# Patient Record
Sex: MALE | Race: WHITE
[De-identification: names, ages, dates, MRNs, and addresses within clinical notes are randomized per-mention and may not be internally consistent; named-entity substitution may affect disease eponyms.]

---

## 2020-07-02 ENCOUNTER — HOSPITAL ENCOUNTER (EMERGENCY)
Dept: HOSPITAL 12 - ER | Age: 75
Discharge: HOME | End: 2020-07-02
Payer: COMMERCIAL

## 2020-07-02 VITALS — SYSTOLIC BLOOD PRESSURE: 160 MMHG | DIASTOLIC BLOOD PRESSURE: 100 MMHG

## 2020-07-02 VITALS — WEIGHT: 260 LBS | BODY MASS INDEX: 35.21 KG/M2 | HEIGHT: 72 IN

## 2020-07-02 DIAGNOSIS — I49.1: ICD-10-CM

## 2020-07-02 DIAGNOSIS — E11.9: ICD-10-CM

## 2020-07-02 DIAGNOSIS — I51.7: ICD-10-CM

## 2020-07-02 DIAGNOSIS — R09.89: Primary | ICD-10-CM

## 2020-07-02 PROCEDURE — A4663 DIALYSIS BLOOD PRESSURE CUFF: HCPCS

## 2020-07-02 NOTE — NUR
Patient cleared for DC to home.  Written and verbal after care instructions 
given. 

Patient verbalizes understanding of instructions. Stressed follow up to 
specialist provided by MD. Ambulated out of ER in steady gait and stable 
condition.

## 2020-07-12 ENCOUNTER — HOSPITAL ENCOUNTER (EMERGENCY)
Dept: HOSPITAL 12 - ER | Age: 75
Discharge: ANOTHER HOSPITAL | End: 2020-07-12
Payer: COMMERCIAL

## 2020-07-12 VITALS — BODY MASS INDEX: 35.21 KG/M2 | WEIGHT: 260 LBS | HEIGHT: 72 IN

## 2020-07-12 VITALS — SYSTOLIC BLOOD PRESSURE: 142 MMHG | DIASTOLIC BLOOD PRESSURE: 84 MMHG

## 2020-07-12 DIAGNOSIS — Z90.49: ICD-10-CM

## 2020-07-12 DIAGNOSIS — Z79.84: ICD-10-CM

## 2020-07-12 DIAGNOSIS — I51.7: ICD-10-CM

## 2020-07-12 DIAGNOSIS — Z96.643: ICD-10-CM

## 2020-07-12 DIAGNOSIS — I49.1: ICD-10-CM

## 2020-07-12 DIAGNOSIS — N13.30: ICD-10-CM

## 2020-07-12 DIAGNOSIS — D72.829: ICD-10-CM

## 2020-07-12 DIAGNOSIS — E11.9: ICD-10-CM

## 2020-07-12 DIAGNOSIS — R09.89: ICD-10-CM

## 2020-07-12 DIAGNOSIS — N17.9: Primary | ICD-10-CM

## 2020-07-12 LAB
ALP SERPL-CCNC: 60 U/L (ref 50–136)
ALT SERPL W/O P-5'-P-CCNC: 20 U/L (ref 16–63)
APPEARANCE UR: CLEAR
AST SERPL-CCNC: 14 U/L (ref 15–37)
BASOPHILS # BLD AUTO: 0.1 K/UL (ref 0–8)
BASOPHILS NFR BLD AUTO: 0.7 % (ref 0–2)
BILIRUB DIRECT SERPL-MCNC: 0.1 MG/DL (ref 0–0.2)
BILIRUB SERPL-MCNC: 0.3 MG/DL (ref 0.2–1)
BILIRUB UR QL STRIP: NEGATIVE
BUN SERPL-MCNC: 123 MG/DL (ref 7–18)
CHLORIDE SERPL-SCNC: 98 MMOL/L (ref 98–107)
CO2 SERPL-SCNC: 21 MMOL/L (ref 21–32)
COLOR UR: YELLOW
CREAT SERPL-MCNC: 7.6 MG/DL (ref 0.6–1.3)
DEPRECATED SQUAMOUS URNS QL MICRO: (no result) /HPF
EOSINOPHIL # BLD AUTO: 0.1 K/UL (ref 0–0.7)
EOSINOPHIL NFR BLD AUTO: 0.6 % (ref 0–7)
GLUCOSE SERPL-MCNC: 100 MG/DL (ref 74–106)
GLUCOSE UR STRIP-MCNC: NEGATIVE MG/DL
HCT VFR BLD AUTO: 39.3 % (ref 36.7–47.1)
HGB BLD-MCNC: 13.1 G/DL (ref 12.5–16.3)
HGB UR QL STRIP: (no result)
KETONES UR STRIP-MCNC: NEGATIVE MG/DL
LEUKOCYTE ESTERASE UR QL STRIP: NEGATIVE
LYMPHOCYTES # BLD AUTO: 1 K/UL (ref 20–40)
LYMPHOCYTES NFR BLD AUTO: 7.2 % (ref 20.5–51.5)
MAGNESIUM SERPL-MCNC: 2.5 MG/DL (ref 1.8–2.4)
MCH RBC QN AUTO: 28 UUG (ref 23.8–33.4)
MCHC RBC AUTO-ENTMCNC: 33 G/DL (ref 32.5–36.3)
MCV RBC AUTO: 84.1 FL (ref 73–96.2)
MONOCYTES # BLD AUTO: 1.1 K/UL (ref 2–10)
MONOCYTES NFR BLD AUTO: 7.5 % (ref 0–11)
NEUTROPHILS # BLD AUTO: 12.1 K/UL (ref 1.8–8.9)
NEUTROPHILS NFR BLD AUTO: 84 % (ref 38.5–71.5)
NITRITE UR QL STRIP: NEGATIVE
PH UR STRIP: 6 [PH] (ref 5–8)
PHOSPHATE SERPL-MCNC: 7.6 MG/DL (ref 2.5–4.9)
PLATELET # BLD AUTO: 275 K/UL (ref 152–348)
POTASSIUM SERPL-SCNC: 4.9 MMOL/L (ref 3.5–5.1)
RBC # BLD AUTO: 4.68 MIL/UL (ref 4.06–5.63)
RBC #/AREA URNS HPF: (no result) /HPF (ref 0–3)
SP GR UR STRIP: 1 (ref 1–1.03)
UROBILINOGEN UR STRIP-MCNC: 0.2 E.U./DL
WBC # BLD AUTO: 14.4 K/UL (ref 3.6–10.2)
WBC #/AREA URNS HPF: (no result) /HPF
WBC #/AREA URNS HPF: (no result) /HPF (ref 0–3)
WS STN SPEC: 7.1 G/DL (ref 6.4–8.2)

## 2020-07-12 PROCEDURE — 96361 HYDRATE IV INFUSION ADD-ON: CPT

## 2020-07-12 PROCEDURE — 36415 COLL VENOUS BLD VENIPUNCTURE: CPT

## 2020-07-12 PROCEDURE — 87086 URINE CULTURE/COLONY COUNT: CPT

## 2020-07-12 PROCEDURE — 74022 RADEX COMPL AQT ABD SERIES: CPT

## 2020-07-12 PROCEDURE — 80048 BASIC METABOLIC PNL TOTAL CA: CPT

## 2020-07-12 PROCEDURE — 84484 ASSAY OF TROPONIN QUANT: CPT

## 2020-07-12 PROCEDURE — 80076 HEPATIC FUNCTION PANEL: CPT

## 2020-07-12 PROCEDURE — 74176 CT ABD & PELVIS W/O CONTRAST: CPT

## 2020-07-12 PROCEDURE — 99285 EMERGENCY DEPT VISIT HI MDM: CPT

## 2020-07-12 PROCEDURE — 81001 URINALYSIS AUTO W/SCOPE: CPT

## 2020-07-12 PROCEDURE — 83880 ASSAY OF NATRIURETIC PEPTIDE: CPT

## 2020-07-12 PROCEDURE — 85730 THROMBOPLASTIN TIME PARTIAL: CPT

## 2020-07-12 PROCEDURE — 93005 ELECTROCARDIOGRAM TRACING: CPT

## 2020-07-12 PROCEDURE — 84100 ASSAY OF PHOSPHORUS: CPT

## 2020-07-12 PROCEDURE — 51702 INSERT TEMP BLADDER CATH: CPT

## 2020-07-12 PROCEDURE — 87040 BLOOD CULTURE FOR BACTERIA: CPT

## 2020-07-12 PROCEDURE — 85025 COMPLETE CBC W/AUTO DIFF WBC: CPT

## 2020-07-12 PROCEDURE — A4663 DIALYSIS BLOOD PRESSURE CUFF: HCPCS

## 2020-07-12 PROCEDURE — 83735 ASSAY OF MAGNESIUM: CPT

## 2020-07-12 PROCEDURE — 96374 THER/PROPH/DIAG INJ IV PUSH: CPT

## 2020-07-12 NOTE — NUR
PT AND WIFE UNABLE TO RECALL MEDICATIONS. WIFE STATES SHE IS GOING HOME AND 
BRING ALL HIS MEDS LIST ASAP.

## 2020-07-12 NOTE — NUR
REPORT WAS GIVEN TO YINA CURRAN FROM [facility]. PT WAS 
TRANSFERED TO [facility] ROOM#212 [facility]IA [facility]. ADMITTING 
MD IS DR PAREKH. NO S/S OF DISTRESS AT THE TIME OF TRANSFER.

## 2020-07-16 ENCOUNTER — HOSPITAL ENCOUNTER (EMERGENCY)
Dept: HOSPITAL 12 - ER | Age: 75
Discharge: HOME | End: 2020-07-16
Payer: COMMERCIAL

## 2020-07-16 VITALS — BODY MASS INDEX: 33.86 KG/M2 | HEIGHT: 72 IN | WEIGHT: 250 LBS

## 2020-07-16 DIAGNOSIS — Z43.6: Primary | ICD-10-CM

## 2020-07-16 DIAGNOSIS — R33.9: ICD-10-CM

## 2020-07-16 DIAGNOSIS — E11.9: ICD-10-CM

## 2020-07-16 DIAGNOSIS — Z87.448: ICD-10-CM

## 2020-07-16 DIAGNOSIS — Z79.84: ICD-10-CM

## 2020-07-16 PROCEDURE — A4663 DIALYSIS BLOOD PRESSURE CUFF: HCPCS

## 2020-07-16 NOTE — NUR
Charge YINA Ansari removed the urinary catheter.  Catheter intact.  Patient 
tolerated the removal well.

## 2020-07-16 NOTE — NUR
Patient discharged to home in stable condition & brisk steady gait.  Written 
and verbal after care instructions given to patient. Patient verbalized 
understanding & compliance of instructions. Stressed follow up with a urologist 
& his primary doctor or return to ER for worsening s/s.

## 2020-07-24 ENCOUNTER — HOSPITAL ENCOUNTER (EMERGENCY)
Dept: HOSPITAL 12 - ER | Age: 75
Discharge: HOME | End: 2020-07-24
Payer: COMMERCIAL

## 2020-07-24 VITALS — HEIGHT: 74 IN | BODY MASS INDEX: 32.08 KG/M2 | WEIGHT: 250 LBS

## 2020-07-24 VITALS — SYSTOLIC BLOOD PRESSURE: 135 MMHG | DIASTOLIC BLOOD PRESSURE: 88 MMHG

## 2020-07-24 DIAGNOSIS — I11.9: ICD-10-CM

## 2020-07-24 DIAGNOSIS — Z79.84: ICD-10-CM

## 2020-07-24 DIAGNOSIS — N40.1: ICD-10-CM

## 2020-07-24 DIAGNOSIS — Z46.6: Primary | ICD-10-CM

## 2020-07-24 DIAGNOSIS — E87.8: ICD-10-CM

## 2020-07-24 DIAGNOSIS — E11.9: ICD-10-CM

## 2020-07-24 DIAGNOSIS — E78.5: ICD-10-CM

## 2020-07-24 DIAGNOSIS — E87.1: ICD-10-CM

## 2020-07-24 DIAGNOSIS — I49.8: ICD-10-CM

## 2020-07-24 DIAGNOSIS — F45.8: ICD-10-CM

## 2020-07-24 DIAGNOSIS — R33.8: ICD-10-CM

## 2020-07-24 LAB
ALP SERPL-CCNC: 84 U/L (ref 50–136)
ALT SERPL W/O P-5'-P-CCNC: 41 U/L (ref 16–63)
AMORPH URATE CRY URNS QL MICRO: (no result) /HPF
APPEARANCE UR: CLEAR
AST SERPL-CCNC: 16 U/L (ref 15–37)
BASOPHILS # BLD AUTO: 0.1 K/UL (ref 0–8)
BASOPHILS NFR BLD AUTO: 0.8 % (ref 0–2)
BILIRUB DIRECT SERPL-MCNC: 0.1 MG/DL (ref 0–0.2)
BILIRUB SERPL-MCNC: 0.4 MG/DL (ref 0.2–1)
BILIRUB UR QL STRIP: NEGATIVE
BUN SERPL-MCNC: 50 MG/DL (ref 7–18)
CHLORIDE SERPL-SCNC: 97 MMOL/L (ref 98–107)
CO2 SERPL-SCNC: 28 MMOL/L (ref 21–32)
COLOR UR: YELLOW
CREAT SERPL-MCNC: 2.4 MG/DL (ref 0.6–1.3)
DEPRECATED SQUAMOUS URNS QL MICRO: (no result) /HPF
EOSINOPHIL # BLD AUTO: 0.2 K/UL (ref 0–0.7)
EOSINOPHIL NFR BLD AUTO: 1.6 % (ref 0–7)
GLUCOSE SERPL-MCNC: 178 MG/DL (ref 74–106)
GLUCOSE UR STRIP-MCNC: NEGATIVE MG/DL
HCT VFR BLD AUTO: 41.5 % (ref 36.7–47.1)
HGB BLD-MCNC: 13.7 G/DL (ref 12.5–16.3)
HGB UR QL STRIP: (no result)
KETONES UR STRIP-MCNC: NEGATIVE MG/DL
LEUKOCYTE ESTERASE UR QL STRIP: (no result)
LYMPHOCYTES # BLD AUTO: 1.4 K/UL (ref 20–40)
LYMPHOCYTES NFR BLD AUTO: 10.5 % (ref 20.5–51.5)
MAGNESIUM SERPL-MCNC: 2.3 MG/DL (ref 1.8–2.4)
MCH RBC QN AUTO: 27.5 UUG (ref 23.8–33.4)
MCHC RBC AUTO-ENTMCNC: 33 G/DL (ref 32.5–36.3)
MCV RBC AUTO: 83.3 FL (ref 73–96.2)
MONOCYTES # BLD AUTO: 0.8 K/UL (ref 2–10)
MONOCYTES NFR BLD AUTO: 5.8 % (ref 0–11)
NEUTROPHILS # BLD AUTO: 10.9 K/UL (ref 1.8–8.9)
NEUTROPHILS NFR BLD AUTO: 81.3 % (ref 38.5–71.5)
NITRITE UR QL STRIP: NEGATIVE
PH UR STRIP: 5 [PH] (ref 5–8)
PHOSPHATE SERPL-MCNC: 4.6 MG/DL (ref 2.5–4.9)
PLATELET # BLD AUTO: 304 K/UL (ref 152–348)
POTASSIUM SERPL-SCNC: 4.2 MMOL/L (ref 3.5–5.1)
RBC # BLD AUTO: 4.98 MIL/UL (ref 4.06–5.63)
RBC #/AREA URNS HPF: (no result) /HPF (ref 0–3)
SP GR UR STRIP: 1.01 (ref 1–1.03)
UROBILINOGEN UR STRIP-MCNC: 0.2 E.U./DL
WBC # BLD AUTO: 13.5 K/UL (ref 3.6–10.2)
WBC #/AREA URNS HPF: (no result) /HPF
WBC #/AREA URNS HPF: (no result) /HPF (ref 0–3)
WS STN SPEC: 7.8 G/DL (ref 6.4–8.2)

## 2020-07-24 PROCEDURE — A4663 DIALYSIS BLOOD PRESSURE CUFF: HCPCS

## 2020-08-19 ENCOUNTER — HOSPITAL ENCOUNTER (EMERGENCY)
Dept: HOSPITAL 12 - ER | Age: 75
Discharge: HOME | End: 2020-08-19
Payer: COMMERCIAL

## 2020-08-19 VITALS — DIASTOLIC BLOOD PRESSURE: 86 MMHG | SYSTOLIC BLOOD PRESSURE: 145 MMHG

## 2020-08-19 VITALS — BODY MASS INDEX: 33.86 KG/M2 | WEIGHT: 250 LBS | HEIGHT: 72 IN

## 2020-08-19 DIAGNOSIS — R60.0: ICD-10-CM

## 2020-08-19 DIAGNOSIS — T83.031A: Primary | ICD-10-CM

## 2020-08-19 DIAGNOSIS — Z79.84: ICD-10-CM

## 2020-08-19 DIAGNOSIS — Z79.899: ICD-10-CM

## 2020-08-19 DIAGNOSIS — E11.9: ICD-10-CM

## 2020-08-19 PROCEDURE — A4663 DIALYSIS BLOOD PRESSURE CUFF: HCPCS

## 2020-10-06 ENCOUNTER — HOSPITAL ENCOUNTER (EMERGENCY)
Dept: HOSPITAL 12 - ER | Age: 75
Discharge: HOME | End: 2020-10-06
Payer: COMMERCIAL

## 2020-10-06 VITALS — WEIGHT: 250 LBS | BODY MASS INDEX: 33.86 KG/M2 | HEIGHT: 72 IN

## 2020-10-06 VITALS — DIASTOLIC BLOOD PRESSURE: 76 MMHG | SYSTOLIC BLOOD PRESSURE: 139 MMHG

## 2020-10-06 DIAGNOSIS — E11.9: ICD-10-CM

## 2020-10-06 DIAGNOSIS — N39.9: ICD-10-CM

## 2020-10-06 DIAGNOSIS — N39.0: ICD-10-CM

## 2020-10-06 DIAGNOSIS — Z43.6: Primary | ICD-10-CM

## 2020-10-06 DIAGNOSIS — Z79.84: ICD-10-CM

## 2020-10-06 LAB
AMORPH PHOS CRY URNS QL MICRO: (no result) /HPF
APPEARANCE UR: (no result)
BILIRUB UR QL STRIP: NEGATIVE
COLOR UR: YELLOW
DEPRECATED SQUAMOUS URNS QL MICRO: (no result) /HPF
GLUCOSE UR STRIP-MCNC: (no result) MG/DL
HGB UR QL STRIP: (no result)
KETONES UR STRIP-MCNC: NEGATIVE MG/DL
LEUKOCYTE ESTERASE UR QL STRIP: (no result)
NITRITE UR QL STRIP: POSITIVE
PH UR STRIP: 7 [PH] (ref 5–8)
RBC #/AREA URNS HPF: (no result) /HPF (ref 0–3)
SP GR UR STRIP: 1.02 (ref 1–1.03)
UROBILINOGEN UR STRIP-MCNC: 0.2 E.U./DL
WBC #/AREA URNS HPF: (no result) /HPF
WBC #/AREA URNS HPF: (no result) /HPF (ref 0–3)

## 2020-10-06 PROCEDURE — A4663 DIALYSIS BLOOD PRESSURE CUFF: HCPCS

## 2020-10-06 NOTE — NUR
Patient discharged to home in stable condition.  Written and verbal after care 
instructions given. 

Patient verbalizes understanding of instructions. Stressed follow up or return 
to ER for worsening s/s. Patient ambulated with steady gait. NAD noted

## 2020-10-24 ENCOUNTER — HOSPITAL ENCOUNTER (EMERGENCY)
Dept: HOSPITAL 12 - ER | Age: 75
Discharge: HOME | End: 2020-10-24
Payer: COMMERCIAL

## 2020-10-24 VITALS — BODY MASS INDEX: 33.86 KG/M2 | HEIGHT: 72 IN | WEIGHT: 250 LBS

## 2020-10-24 DIAGNOSIS — E11.8: ICD-10-CM

## 2020-10-24 DIAGNOSIS — R33.8: ICD-10-CM

## 2020-10-24 DIAGNOSIS — Z79.899: ICD-10-CM

## 2020-10-24 DIAGNOSIS — Z79.84: ICD-10-CM

## 2020-10-24 DIAGNOSIS — Z96.643: ICD-10-CM

## 2020-10-24 DIAGNOSIS — N40.1: ICD-10-CM

## 2020-10-24 DIAGNOSIS — I10: ICD-10-CM

## 2020-10-24 DIAGNOSIS — Z46.6: Primary | ICD-10-CM

## 2020-10-24 PROCEDURE — A4663 DIALYSIS BLOOD PRESSURE CUFF: HCPCS

## 2020-10-24 NOTE — NUR
Patient refused female RN to insert his catheter.  "I want male nurse." per 
patient's verbalization.

## 2020-10-24 NOTE — NUR
Urine catheter inserted by YINA Veloz without incident.  Patient tolerated the 
insertion well. Leg bag provided and a spears catheter bag was on per patient's 
request. Extra "slack" on the leg bag's ties was also done per patient's 
request for comfort.  Patient expressed satisfaction about his urinary catheter 
& it's leg bag location. "DO NOT REMOVE YOUR OWN CATHETER" was emphasized 
repeatedly. Patient discharged to home in stable condition with steady gait.  
Written and verbal after care instructions given to patient. Patient verbalizes 
understanding and compliance of instructions. Stressed follow up with his 
urologist in 1-2 days and his primary doctors or return to ER for worsening s/s 
and for patient not to remove his own urinary catheter.

## 2020-11-03 ENCOUNTER — HOSPITAL ENCOUNTER (EMERGENCY)
Dept: HOSPITAL 12 - ER | Age: 75
Discharge: LEFT BEFORE DISCHARGE | End: 2020-11-03
Payer: COMMERCIAL

## 2020-11-03 VITALS — WEIGHT: 250 LBS | HEIGHT: 72 IN | BODY MASS INDEX: 33.86 KG/M2

## 2020-11-03 DIAGNOSIS — N40.1: ICD-10-CM

## 2020-11-03 DIAGNOSIS — E66.9: ICD-10-CM

## 2020-11-03 DIAGNOSIS — E11.9: ICD-10-CM

## 2020-11-03 DIAGNOSIS — R33.8: ICD-10-CM

## 2020-11-03 DIAGNOSIS — Z43.6: Primary | ICD-10-CM

## 2020-11-03 PROCEDURE — A4663 DIALYSIS BLOOD PRESSURE CUFF: HCPCS

## 2024-12-14 ENCOUNTER — HOSPITAL ENCOUNTER (EMERGENCY)
Dept: HOSPITAL 12 - ER | Age: 79
Discharge: LEFT BEFORE DISCHARGE | End: 2024-12-14
Payer: COMMERCIAL

## 2024-12-14 VITALS — OXYGEN SATURATION: 95 %

## 2024-12-14 VITALS — WEIGHT: 230 LBS | BODY MASS INDEX: 30.48 KG/M2 | HEIGHT: 73 IN

## 2024-12-14 DIAGNOSIS — R65.20: ICD-10-CM

## 2024-12-14 DIAGNOSIS — E66.9: ICD-10-CM
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
## 2024-12-14 DIAGNOSIS — L03.116: ICD-10-CM

## 2024-12-14 DIAGNOSIS — N39.0: Primary | ICD-10-CM
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
## 2024-12-14 DIAGNOSIS — E11.9: ICD-10-CM
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
## 2024-12-14 DIAGNOSIS — L03.115: ICD-10-CM

## 2024-12-14 DIAGNOSIS — Z79.84: ICD-10-CM

## 2024-12-14 DIAGNOSIS — A41.9: ICD-10-CM

## 2024-12-14 DIAGNOSIS — Z79.899: ICD-10-CM

## 2024-12-14 DIAGNOSIS — I11.9: ICD-10-CM
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
## 2024-12-14 LAB
ALBUMIN SERPL BCG-MCNC: 3.3 G/DL (ref 3.4–5)
ALP SERPL-CCNC: 135 U/L (ref 50–136)
ALT SERPL W/O P-5'-P-CCNC: 20 U/L (ref 16–63)
APPEARANCE UR: (no result)
AST SERPL-CCNC: 6 U/L (ref 15–37)
BASOPHILS # BLD AUTO: 0.1 K/UL (ref 0–0.2)
BASOPHILS NFR BLD AUTO: 0.9 % (ref 0–2)
BILIRUB SERPL-MCNC: 0.4 MG/DL (ref 0.2–1)
BILIRUB UR QL STRIP: NEGATIVE
BUN SERPL-MCNC: 21 MG/DL (ref 7–18)
CALCIUM SERPL-MCNC: 9.4 MG/DL (ref 8.5–10.1)
CHLORIDE SERPL-SCNC: 100 MMOL/L (ref 98–107)
CO2 SERPL-SCNC: 27 MMOL/L (ref 21–32)
COLOR UR: YELLOW
CREAT SERPL-MCNC: 1.4 MG/DL (ref 0.6–1.3)
DEPRECATED SQUAMOUS URNS QL MICRO: (no result) /HPF
EOSINOPHIL # BLD AUTO: 0.1 K/UL (ref 0–0.7)
EOSINOPHIL NFR BLD AUTO: 0.8 % (ref 0–7)
ERYTHROCYTE [DISTWIDTH] IN BLOOD BY AUTOMATED COUNT: 14.1 % (ref 12.1–16.2)
GLUCOSE SERPL-MCNC: 261 MG/DL (ref 74–106)
GLUCOSE UR STRIP-MCNC: (no result) MG/DL
HCT VFR BLD AUTO: 44.6 % (ref 36.7–47.1)
HGB BLD-MCNC: 14.7 G/DL (ref 12.5–16.3)
HGB UR QL STRIP: (no result)
KETONES UR STRIP-MCNC: NEGATIVE MG/DL
LACTATE SERPL-SCNC: 2.2 MMOL/L (ref 0.4–2)
LEUKOCYTE ESTERASE UR QL STRIP: (no result)
LYMPHOCYTES # BLD AUTO: 1.5 K/UL (ref 0.8–4.8)
LYMPHOCYTES NFR BLD AUTO: 11.6 % (ref 20.5–51.5)
MANUAL DIF COMMENT BLD-IMP: 1
MCH RBC QN AUTO: 28 UUG (ref 23.8–33.4)
MCHC RBC AUTO-ENTMCNC: 33 G/DL (ref 32.5–36.3)
MCV RBC AUTO: 84.7 FL (ref 73–96.2)
MONOCYTES # BLD AUTO: 0.8 K/UL (ref 0.1–1.3)
MONOCYTES NFR BLD AUTO: 5.9 % (ref 0–11)
NEUTROPHILS # BLD AUTO: 10.4 K/UL (ref 1.8–8.9)
NEUTROPHILS NFR BLD AUTO: 80.8 % (ref 38.5–71.5)
NITRITE UR QL STRIP: NEGATIVE
NT-PROBNP SERPL-MCNC: 423 PG/ML (ref 0–125)
PH UR STRIP: 5 [PH] (ref 5–8)
PLATELET # BLD AUTO: 315 K/UL (ref 152–348)
POTASSIUM SERPL-SCNC: 3.8 MMOL/L (ref 3.5–5.1)
PROT UR QL STRIP: (no result)
RBC # BLD AUTO: 5.27 MIL/UL (ref 4.06–5.63)
SODIUM SERPL-SCNC: 137 MMOL/L (ref 136–145)
SP GR UR STRIP: 1.01 (ref 1–1.03)
UROBILINOGEN UR STRIP-MCNC: 0.2 E.U./DL
WBC # BLD AUTO: 12.9 K/UL (ref 3.6–10.2)
WBC #/AREA URNS HPF: (no result) /HPF
WBC #/AREA URNS HPF: (no result) /HPF (ref 0–3)
WS STN SPEC: 9 G/DL (ref 6.4–8.2)
YEAST URNS QL MICRO: (no result) /HPF

## 2024-12-14 PROCEDURE — 99291 CRITICAL CARE FIRST HOUR: CPT

## 2024-12-14 PROCEDURE — 85025 COMPLETE CBC W/AUTO DIFF WBC: CPT

## 2024-12-14 PROCEDURE — 84484 ASSAY OF TROPONIN QUANT: CPT

## 2024-12-14 PROCEDURE — 87040 BLOOD CULTURE FOR BACTERIA: CPT

## 2024-12-14 PROCEDURE — 83605 ASSAY OF LACTIC ACID: CPT

## 2024-12-14 PROCEDURE — 93005 ELECTROCARDIOGRAM TRACING: CPT

## 2024-12-14 PROCEDURE — 81001 URINALYSIS AUTO W/SCOPE: CPT

## 2024-12-14 PROCEDURE — 85610 PROTHROMBIN TIME: CPT

## 2024-12-14 PROCEDURE — 80053 COMPREHEN METABOLIC PANEL: CPT

## 2024-12-14 PROCEDURE — A4663 DIALYSIS BLOOD PRESSURE CUFF: HCPCS

## 2024-12-14 PROCEDURE — 36415 COLL VENOUS BLD VENIPUNCTURE: CPT

## 2024-12-14 PROCEDURE — 96372 THER/PROPH/DIAG INJ SC/IM: CPT

## 2024-12-14 PROCEDURE — 82248 BILIRUBIN DIRECT: CPT

## 2024-12-14 PROCEDURE — A4606 OXYGEN PROBE USED W OXIMETER: HCPCS

## 2024-12-14 PROCEDURE — 83880 ASSAY OF NATRIURETIC PEPTIDE: CPT

## 2024-12-14 RX ADMIN — SODIUM CHLORIDE ONE MLS/HR: 0.9 INJECTION, SOLUTION INTRAVENOUS at 21:15

## 2024-12-14 RX ADMIN — DEXTROSE ONE G: 50 INJECTION, SOLUTION INTRAVENOUS at 22:15

## 2025-01-28 ENCOUNTER — HOSPITAL ENCOUNTER (EMERGENCY)
Dept: HOSPITAL 12 - ER | Age: 80
Discharge: LEFT BEFORE DISCHARGE | End: 2025-01-28
Payer: COMMERCIAL

## 2025-01-28 DIAGNOSIS — Z53.21: ICD-10-CM

## 2025-01-28 DIAGNOSIS — R53.1: Primary | ICD-10-CM
